# Patient Record
Sex: FEMALE | Race: WHITE | Employment: FULL TIME | URBAN - METROPOLITAN AREA
[De-identification: names, ages, dates, MRNs, and addresses within clinical notes are randomized per-mention and may not be internally consistent; named-entity substitution may affect disease eponyms.]

---

## 2021-07-20 ENCOUNTER — HOSPITAL ENCOUNTER (OUTPATIENT)
Dept: ULTRASOUND IMAGING | Age: 61
Discharge: HOME | End: 2021-07-22
Payer: COMMERCIAL

## 2021-07-20 DIAGNOSIS — I15.9 SECONDARY HYPERTENSION: ICD-10-CM

## 2021-07-20 DIAGNOSIS — R79.89 PROLACTIN INCREASED: ICD-10-CM

## 2021-07-20 PROCEDURE — 93975 VASCULAR STUDY: CPT

## 2021-11-19 LAB
ALBUMIN SERPL-MCNC: 4.6 G/DL (ref 3.5–4.6)
ALP BLD-CCNC: 120 U/L (ref 40–130)
ALT SERPL-CCNC: <5 U/L (ref 0–33)
ANION GAP SERPL CALCULATED.3IONS-SCNC: 11 MEQ/L (ref 9–15)
AST SERPL-CCNC: <5 U/L (ref 0–35)
BILIRUB SERPL-MCNC: 0.7 MG/DL (ref 0.2–0.7)
BUN BLDV-MCNC: 25 MG/DL (ref 8–23)
CALCIUM SERPL-MCNC: 9.6 MG/DL (ref 8.5–9.9)
CHLORIDE BLD-SCNC: 101 MEQ/L (ref 95–107)
CO2: 26 MEQ/L (ref 20–31)
CREAT SERPL-MCNC: 1.15 MG/DL (ref 0.5–0.9)
GFR AFRICAN AMERICAN: 58
GFR NON-AFRICAN AMERICAN: 48
GLOBULIN: 2.8 G/DL (ref 2.3–3.5)
GLUCOSE BLD-MCNC: 83 MG/DL (ref 70–99)
HCT VFR BLD CALC: 44.4 % (ref 37–47)
HEMOGLOBIN: 14.6 G/DL (ref 12–16)
MCH RBC QN AUTO: 29.6 PG (ref 27–31.3)
MCHC RBC AUTO-ENTMCNC: 32.9 % (ref 33–37)
MCV RBC AUTO: 90 FL (ref 82–100)
PDW BLD-RTO: 12.4 % (ref 11.5–14.5)
PLATELET # BLD: 273 K/UL (ref 130–400)
POTASSIUM SERPL-SCNC: 4.2 MEQ/L (ref 3.4–4.9)
RBC # BLD: 4.94 M/UL (ref 4.2–5.4)
SODIUM BLD-SCNC: 138 MEQ/L (ref 135–144)
TOTAL PROTEIN: 7.4 G/DL (ref 6.3–8)
WBC # BLD: 6.2 K/UL (ref 4.8–10.8)

## 2021-12-19 ENCOUNTER — VIRTUAL VISIT (OUTPATIENT)
Dept: FAMILY MEDICINE CLINIC | Age: 61
End: 2021-12-19
Payer: COMMERCIAL

## 2021-12-19 DIAGNOSIS — Z20.822 EXPOSURE TO COVID-19 VIRUS: Primary | ICD-10-CM

## 2021-12-19 DIAGNOSIS — Z20.822 ENCOUNTER FOR LABORATORY TESTING FOR COVID-19 VIRUS: ICD-10-CM

## 2021-12-19 LAB
Lab: NORMAL
PERFORMING INSTRUMENT: NORMAL
QC PASS/FAIL: NORMAL
SARS-COV-2, POC: NORMAL

## 2021-12-19 PROCEDURE — 99213 OFFICE O/P EST LOW 20 MIN: CPT | Performed by: NURSE PRACTITIONER

## 2021-12-19 PROCEDURE — 87426 SARSCOV CORONAVIRUS AG IA: CPT | Performed by: NURSE PRACTITIONER

## 2021-12-19 ASSESSMENT — PATIENT HEALTH QUESTIONNAIRE - PHQ9
2. FEELING DOWN, DEPRESSED OR HOPELESS: 0
1. LITTLE INTEREST OR PLEASURE IN DOING THINGS: 0
SUM OF ALL RESPONSES TO PHQ QUESTIONS 1-9: 0
SUM OF ALL RESPONSES TO PHQ9 QUESTIONS 1 & 2: 0
SUM OF ALL RESPONSES TO PHQ QUESTIONS 1-9: 0
SUM OF ALL RESPONSES TO PHQ QUESTIONS 1-9: 0

## 2021-12-19 ASSESSMENT — ENCOUNTER SYMPTOMS
ABDOMINAL PAIN: 0
CHEST TIGHTNESS: 0
SORE THROAT: 1
RHINORRHEA: 1
COUGH: 1
NAUSEA: 1
DIARRHEA: 0
WHEEZING: 0
SHORTNESS OF BREATH: 0
VOMITING: 0

## 2021-12-19 NOTE — PROGRESS NOTES
TELEHEALTH EVALUATION -- Audio/Visual (During PWPAQ-64 public health emergency)    -   Jane Wiley is a 64 y.o. female being evaluated by a Virtual Visit (video visit) encounter to address concerns as mentioned above. A caregiver was present when appropriate. Due to this being a TeleHealth encounter (During UWWTO-54 public health emergency), evaluation of the following organ systems was limited: Vitals/Constitutional/EENT/Resp/CV/GI//MS/Neuro/Skin/Heme-Lymph-Imm. Pursuant to the emergency declaration under the [facility], [address] [place] [address] authority and the [employer] Act, this Virtual Visit was conducted with patient's (and/or legal guardian's) consent, to reduce the patient's risk of exposure to COVID-19 and provide necessary medical care. The patient (and/or legal guardian) has also been advised to contact this office for worsening conditions or problems, and seek emergency medical treatment and/or call 911 if deemed necessary. Patient was contacted and agreed to proceed with a virtual visit via Telephone Visit  The risks and benefits of converting to a virtual visit were discussed in light of the current infectious disease epidemic. Patient also understood that insurance coverage and co-pays are up to their individual insurance plans. Patient was located at their home. Provider was located at their office.      2021  Baldemar Uriostegui (:  1960) has requested an audio/video evaluation for the following concern(s):    HPI  VV audio for COVID-19 testing   Exposure to daughter who has tested positive COVID-19  #2 Moderna COVID-19 vaccine  Symptoms started Friday   Dizzy, lightheaded, h/a, cough, PND, sore throat & nasal drainage  Cough is dry  Denies chest tightness or SOB  Denies chest pain   Nausea w/o emesis  Denies diarrhea   Fatigued  Eating and drinking well   Sleep uninterrupted                       Review of Systems   Constitutional: Positive for fatigue. Negative for activity change, appetite change, chills, diaphoresis and fever. HENT: Positive for postnasal drip, rhinorrhea and sore throat. Negative for ear pain. Respiratory: Positive for cough. Negative for chest tightness, shortness of breath and wheezing. Cardiovascular: Positive for palpitations. Negative for chest pain. Gastrointestinal: Positive for nausea. Negative for abdominal pain, diarrhea and vomiting. Musculoskeletal: Negative for myalgias, neck pain and neck stiffness. Skin: Negative for rash. Neurological: Positive for dizziness, light-headedness and headaches. Psychiatric/Behavioral: Negative for sleep disturbance. Prior to Visit Medications    Medication Sig Taking? Authorizing Provider   Meth-Hyo-M Bl-Na Phos-Ph Sal (URIBEL) [address] CAPSULE 3 TIMES DAILY OR AS NEEDED  Delta Mcconnell MD   Meth-Hyo-M Bl-Na Phos-Ph Sal (URIBEL) 118 MG CAPS Take 20 capsules by mouth 3 times daily  Delta Mcconnell MD       No past medical history on file.   Past Surgical History:   Procedure Laterality Date    CARPAL TUNNEL RELEASE      THYROIDECTOMY, PARTIAL  2010     Social History     Socioeconomic History    Marital status:      Spouse name: Not on file    Number of children: Not on file    Years of education: Not on file    Highest education level: Not on file   Occupational History    Not on file   Tobacco Use    Smoking status: Never Smoker    Smokeless tobacco: Not on file   Substance and Sexual Activity    Alcohol use: Not on file    Drug use: Not on file    Sexual activity: Not on file   Other Topics Concern    Not on file   Social History Narrative    Not on file     Social Determinants of Health     Financial Resource Strain:     Difficulty of Paying Living Expenses: Not on file   Food Insecurity:     Worried About [employer] in the Last Year: Not on file    Ran Out of Food in the Last Year: Not on file   Transportation Needs:     Lack of Transportation (Medical): Not on file    Lack of Transportation (Non-Medical): Not on file   Physical Activity:     Days of Exercise per Week: Not on file    Minutes of Exercise per Session: Not on file   Stress:     Feeling of Stress : Not on file   Social Connections:     Frequency of Communication with Friends and Family: Not on file    Frequency of Social Gatherings with Friends and Family: Not on file    Attends Confucianism Services: Not on file    Active Member of [address] [place] [address] [employer] Decisions or Organizations: Not on file    Attends Club or Organization Meetings: Not on file    Marital Status: Not on file   Intimate Partner Violence:     Fear of Current or Ex-Partner: Not on file    Emotionally Abused: Not on file    Physically Abused: Not on file    Sexually Abused: Not on file   Housing Stability:     Unable to Pay for Housing in the Last Year: Not on file    Number of [place] in the Last Year: Not on file    Unstable Housing in the Last Year: Not on file     No family history on file. No Known Allergies        PMH, Surgical Hx, Family Hx, and Social Hx reviewed and updated. PHYSICAL EXAMINATION:   Oriented and conversant   No audible distress   No cough throughout visit                     Other pertinent observable physical exam findings-   Results for orders placed or performed in visit on 12/19/21   POCT COVID-19, Antigen   Result Value Ref Range    SARS-COV-2, POC Not-Detected Not Detected    Lot Number [id number]     QC Pass/Fail Pass     Performing Instrument BD Veritor        ASSESSMENT/PLAN:  Assessment & Plan   Noris Rodriguez was seen today for covid testing. Diagnoses and all orders for this visit:    Exposure to COVID-19 virus  -     POCT COVID-19, Antigen  -     Covid-19 Ambulatory;  Future    Encounter for laboratory testing for COVID-19 virus  -     POCT COVID-19, Antigen  -     Covid-19 medications. If you experience any of the red flag s/s, seek care at the ER        Reviewed with the patient: current clinical status. Discussed with patient COVID-19 s/s. Pt aware to remain home until she hears from us about the result. Pt instructed on red flag s/s to go to the ER for or to call 911. Pt verbalized understanding. Will update pt with result when it is available. When to call for help  Call 911 anytime you think you may need emergency care. For example, call if:  · You have severe trouble breathing. · You have severe dehydration. I have reviewed the patient's medical history in detail and updated the computerized patient record. Patient identification was verified at the start of the visit: Yes  Total time spent on this encounter: 20 minutes  >50% of 20 minutes was spent spent on counseling, answering questions, instructions on meds & testing & coordinating the care based on my plan and assessment as noted. --NAI Cheney NP on 12/20/2021 at 11:36 AM    An electronic signature was used to authenticate this note.

## 2021-12-20 DIAGNOSIS — Z20.822 EXPOSURE TO COVID-19 VIRUS: ICD-10-CM

## 2021-12-20 DIAGNOSIS — Z20.822 ENCOUNTER FOR LABORATORY TESTING FOR COVID-19 VIRUS: ICD-10-CM

## 2021-12-21 LAB
SARS-COV-2: NOT DETECTED
SOURCE: NORMAL

## 2022-03-18 LAB
ALBUMIN SERPL-MCNC: 4.4 G/DL (ref 3.5–4.6)
ALP BLD-CCNC: 110 U/L (ref 40–130)
ALT SERPL-CCNC: 10 U/L (ref 0–33)
ANION GAP SERPL CALCULATED.3IONS-SCNC: 14 MEQ/L (ref 9–15)
AST SERPL-CCNC: 16 U/L (ref 0–35)
BILIRUB SERPL-MCNC: 0.7 MG/DL (ref 0.2–0.7)
BILIRUBIN DIRECT: <0.2 MG/DL (ref 0–0.4)
BILIRUBIN, INDIRECT: NORMAL MG/DL (ref 0–0.6)
BUN BLDV-MCNC: 38 MG/DL (ref 8–23)
CALCIUM SERPL-MCNC: 9.8 MG/DL (ref 8.5–9.9)
CHLORIDE BLD-SCNC: 99 MEQ/L (ref 95–107)
CHOLESTEROL, TOTAL: 229 MG/DL (ref 0–199)
CO2: 26 MEQ/L (ref 20–31)
CREAT SERPL-MCNC: 1.3 MG/DL (ref 0.5–0.9)
GFR AFRICAN AMERICAN: 50.3
GFR NON-AFRICAN AMERICAN: 41.6
GLUCOSE BLD-MCNC: 99 MG/DL (ref 70–99)
HCT VFR BLD CALC: 43.5 % (ref 37–47)
HDLC SERPL-MCNC: 61 MG/DL (ref 40–59)
HEMOGLOBIN: 14.6 G/DL (ref 12–16)
LDL CHOLESTEROL CALCULATED: 150 MG/DL (ref 0–129)
MAGNESIUM: 2.6 MG/DL (ref 1.7–2.4)
MCH RBC QN AUTO: 29.8 PG (ref 27–31.3)
MCHC RBC AUTO-ENTMCNC: 33.4 % (ref 33–37)
MCV RBC AUTO: 89.1 FL (ref 82–100)
PDW BLD-RTO: 12.6 % (ref 11.5–14.5)
PLATELET # BLD: 281 K/UL (ref 130–400)
POTASSIUM SERPL-SCNC: 3.7 MEQ/L (ref 3.4–4.9)
RBC # BLD: 4.89 M/UL (ref 4.2–5.4)
SODIUM BLD-SCNC: 139 MEQ/L (ref 135–144)
TOTAL PROTEIN: 6.9 G/DL (ref 6.3–8)
TRIGL SERPL-MCNC: 89 MG/DL (ref 0–150)
WBC # BLD: 7 K/UL (ref 4.8–10.8)

## 2022-09-30 LAB
ANION GAP SERPL CALCULATED.3IONS-SCNC: 14 MEQ/L (ref 9–15)
BUN BLDV-MCNC: 24 MG/DL (ref 8–23)
CALCIUM SERPL-MCNC: 9.6 MG/DL (ref 8.5–9.9)
CHLORIDE BLD-SCNC: 99 MEQ/L (ref 95–107)
CO2: 27 MEQ/L (ref 20–31)
CREAT SERPL-MCNC: 1.02 MG/DL (ref 0.5–0.9)
GFR AFRICAN AMERICAN: >60
GFR NON-AFRICAN AMERICAN: 54.9
GLUCOSE BLD-MCNC: 88 MG/DL (ref 70–99)
HCT VFR BLD CALC: 43.5 % (ref 37–47)
HEMOGLOBIN: 14.6 G/DL (ref 12–16)
MAGNESIUM: 2.1 MG/DL (ref 1.7–2.4)
MCH RBC QN AUTO: 30.1 PG (ref 27–31.3)
MCHC RBC AUTO-ENTMCNC: 33.6 % (ref 33–37)
MCV RBC AUTO: 89.5 FL (ref 82–100)
PDW BLD-RTO: 12.7 % (ref 11.5–14.5)
PLATELET # BLD: 249 K/UL (ref 130–400)
POTASSIUM SERPL-SCNC: 4.2 MEQ/L (ref 3.4–4.9)
RBC # BLD: 4.86 M/UL (ref 4.2–5.4)
SODIUM BLD-SCNC: 140 MEQ/L (ref 135–144)
TSH SERPL DL<=0.05 MIU/L-ACNC: 4.13 UIU/ML (ref 0.44–3.86)
WBC # BLD: 6.1 K/UL (ref 4.8–10.8)

## 2023-08-31 PROBLEM — R42 LIGHTHEADEDNESS: Status: ACTIVE | Noted: 2023-08-31

## 2023-08-31 PROBLEM — R94.30 ABNORMAL CARDIAC FUNCTION TEST: Status: ACTIVE | Noted: 2023-08-31

## 2023-08-31 PROBLEM — I49.1 APC (ATRIAL PREMATURE CONTRACTIONS): Status: ACTIVE | Noted: 2023-08-31

## 2023-08-31 PROBLEM — N28.9 RENAL INSUFFICIENCY: Status: ACTIVE | Noted: 2023-08-31

## 2023-08-31 PROBLEM — M41.9 SCOLIOSIS: Status: ACTIVE | Noted: 2023-08-31

## 2023-08-31 PROBLEM — E78.5 HYPERLIPIDEMIA: Status: ACTIVE | Noted: 2023-08-31

## 2023-08-31 PROBLEM — R63.4 WEIGHT LOSS: Status: ACTIVE | Noted: 2023-08-31

## 2023-08-31 PROBLEM — I49.3 PVC (PREMATURE VENTRICULAR CONTRACTION): Status: ACTIVE | Noted: 2023-08-31

## 2023-08-31 PROBLEM — R55 SYNCOPE: Status: ACTIVE | Noted: 2023-08-31

## 2023-08-31 PROBLEM — R00.2 PALPITATIONS: Status: ACTIVE | Noted: 2023-08-31

## 2023-08-31 PROBLEM — I49.9 IRREGULAR HEART RATE: Status: ACTIVE | Noted: 2023-08-31

## 2023-08-31 PROBLEM — I47.10 SUPRAVENTRICULAR TACHYCARDIA (CMS-HCC): Status: ACTIVE | Noted: 2023-08-31

## 2023-08-31 PROBLEM — I10 HTN (HYPERTENSION): Status: ACTIVE | Noted: 2023-08-31

## 2023-08-31 PROBLEM — R06.02 SOB (SHORTNESS OF BREATH) ON EXERTION: Status: ACTIVE | Noted: 2023-08-31

## 2023-08-31 PROBLEM — R07.9 CHEST PAIN: Status: ACTIVE | Noted: 2023-08-31

## 2023-08-31 RX ORDER — LANOLIN ALCOHOL/MO/W.PET/CERES
1 CREAM (GRAM) TOPICAL DAILY
COMMUNITY
Start: 2021-11-09

## 2023-08-31 RX ORDER — LISINOPRIL 10 MG/1
1 TABLET ORAL DAILY
COMMUNITY

## 2023-08-31 RX ORDER — HYDROCHLOROTHIAZIDE 25 MG/1
1 TABLET ORAL DAILY
COMMUNITY

## 2023-10-09 ENCOUNTER — APPOINTMENT (OUTPATIENT)
Dept: CARDIOLOGY | Facility: CLINIC | Age: 63
End: 2023-10-09
Payer: COMMERCIAL

## 2024-10-16 ENCOUNTER — HOSPITAL ENCOUNTER (OUTPATIENT)
Dept: RADIOLOGY | Facility: HOSPITAL | Age: 64
Discharge: HOME | End: 2024-10-16
Payer: COMMERCIAL

## 2024-10-16 ENCOUNTER — OFFICE VISIT (OUTPATIENT)
Dept: ORTHOPEDIC SURGERY | Facility: CLINIC | Age: 64
End: 2024-10-16
Payer: COMMERCIAL

## 2024-10-16 DIAGNOSIS — M79.605 PAIN OF LEFT LOWER EXTREMITY: ICD-10-CM

## 2024-10-16 DIAGNOSIS — M76.822 LEFT TIBIALIS TENDONITIS: ICD-10-CM

## 2024-10-16 DIAGNOSIS — M84.362A STRESS FRACTURE OF LEFT TIBIA, INITIAL ENCOUNTER: ICD-10-CM

## 2024-10-16 DIAGNOSIS — S93.402A SPRAIN OF LEFT ANKLE, INITIAL ENCOUNTER: Primary | ICD-10-CM

## 2024-10-16 PROCEDURE — 73590 X-RAY EXAM OF LOWER LEG: CPT | Mod: LT

## 2024-10-16 PROCEDURE — L4361 PNEUMA/VAC WALK BOOT PRE OTS: HCPCS | Performed by: STUDENT IN AN ORGANIZED HEALTH CARE EDUCATION/TRAINING PROGRAM

## 2024-10-16 PROCEDURE — 99213 OFFICE O/P EST LOW 20 MIN: CPT | Performed by: STUDENT IN AN ORGANIZED HEALTH CARE EDUCATION/TRAINING PROGRAM

## 2024-10-16 PROCEDURE — 73590 X-RAY EXAM OF LOWER LEG: CPT | Mod: LEFT SIDE | Performed by: RADIOLOGY

## 2024-10-16 NOTE — PROGRESS NOTES
Acute Injury Established Patient Visit    HPI: Lainey is a 63 y.o.female who presents today with new complaints of left leg and left ankle pain and swelling.  States that a year or 2 ago she had a stress fracture in the left tibia.  It was only found on MRI.  She states that recently she was on vacation was walking on the beach a lot.  She was going up and down a lot of steps.  She began having pain into the anterior shin in the same region she had this before.  She denies any interval falls or injuries.  She does state that she had significant swelling and bruising over the medial ankle for which she has been elevating it.  She has been taking Tylenol.  She denies any knee pain.  She denies any numbness tingling.    Plan: For this left ankle sprain, left tibia stress fracture, left tibialis anterior tendinitis, we will place her in a tall boot, precertify for lace up ankle brace, continue with rest, ice, elevation, and acetaminophen.  Will follow-up in 2 weeks with repeat x-rays of the left tib-fib and left ankle to evaluate for possible medial malleolus avulsion fracture given her exam and symptoms as well as looking for any possible stress reaction in the tibia.    Assessment:   Problem List Items Addressed This Visit    None  Visit Diagnoses       Sprain of left ankle, initial encounter    -  Primary    Relevant Orders    Walking boot    Ankle Brace, Lace Up or A60    Pain of left lower extremity        Relevant Orders    XR tibia fibula left 2 views    Stress fracture of left tibia, initial encounter        Relevant Orders    Walking boot    Ankle Brace, Lace Up or A60    Left tibialis tendonitis        Relevant Orders    Walking boot    Ankle Brace, Lace Up or A60            Diagnostics: Reviewed all relevant imaging including x-ray, MRI, CT, and US.      Procedure:  Procedures    Physical Exam:  GENERAL:  No obvious acute distress.  NEURO:  Distally neurovascularly intact.  Sensation intact to light  touch.  Extremity: Left ankle exam shows:  Skin is intact;  No erythema or warmth;  No clinical signs of infection;  No pain over the lateral malleolus;  Mildly TENDER over the medial malleolus;  No pain over the ATF, CF or PTF ligaments;  TENDER over the deltoid ligament with swelling and bruising overlying this region;  No pain over the Achilles tendon;  Negative Uriarte's test;  Mildly POSITIVE squeeze test;  Pain over the anterior distal third of the tibia;  Negative anterior drawer test;  Negative talar tilt test;  No pain over the anterior process of the talus;  No pain over the talar dome;  No pain over the base of the fifth metatarsal bone;  No pain over the calcaneus;  No pain over the plantar aponeurosis;  No pain of the midfoot; and  Neurovascularly intact.    Orders Placed This Encounter    Walking boot    Ankle Brace, Lace Up or A60    XR tibia fibula left 2 views      At the conclusion of the visit there were no further questions by the patient/family regarding their plan of care.  Patient was instructed to call or return with any issues, questions, or concerns regarding their injury and/or treatment plan described above.     10/16/24 at 1:54 PM - Cb Ceja DO    Office: [phone]    This note was prepared using voice recognition software.  The details of this note are correct and have been reviewed, and corrected to the best of my ability.  Some grammatical errors may persist related to the Dragon software.

## 2024-10-18 ENCOUNTER — TELEPHONE (OUTPATIENT)
Dept: ORTHOPEDIC SURGERY | Facility: CLINIC | Age: 64
End: 2024-10-18
Payer: COMMERCIAL

## 2024-10-18 NOTE — TELEPHONE ENCOUNTER
Ankle Lace Up: $144  Coinsurance: 80/20  Patient Out Of Pocket: $144    Individual Deductible:  $2500/$51 Met    Individual OOP:  $3500/$51 Met   no

## 2024-10-30 ENCOUNTER — HOSPITAL ENCOUNTER (OUTPATIENT)
Dept: RADIOLOGY | Facility: HOSPITAL | Age: 64
Discharge: HOME | End: 2024-10-30
Payer: COMMERCIAL

## 2024-10-30 ENCOUNTER — APPOINTMENT (OUTPATIENT)
Dept: ORTHOPEDIC SURGERY | Facility: CLINIC | Age: 64
End: 2024-10-30
Payer: COMMERCIAL

## 2024-10-30 DIAGNOSIS — M84.362A STRESS FRACTURE OF LEFT TIBIA, INITIAL ENCOUNTER: Primary | ICD-10-CM

## 2024-10-30 DIAGNOSIS — M84.362A STRESS FRACTURE OF LEFT TIBIA, INITIAL ENCOUNTER: ICD-10-CM

## 2024-10-30 DIAGNOSIS — S93.402A SPRAIN OF LEFT ANKLE, INITIAL ENCOUNTER: ICD-10-CM

## 2024-10-30 PROCEDURE — 73610 X-RAY EXAM OF ANKLE: CPT | Mod: LT

## 2024-10-30 PROCEDURE — 99213 OFFICE O/P EST LOW 20 MIN: CPT | Performed by: STUDENT IN AN ORGANIZED HEALTH CARE EDUCATION/TRAINING PROGRAM

## 2024-10-30 PROCEDURE — 73590 X-RAY EXAM OF LOWER LEG: CPT | Mod: LT

## 2024-11-12 ENCOUNTER — HOSPITAL ENCOUNTER (OUTPATIENT)
Dept: RADIOLOGY | Facility: HOSPITAL | Age: 64
Discharge: HOME | End: 2024-11-12
Payer: COMMERCIAL

## 2024-11-12 DIAGNOSIS — M84.362A STRESS FRACTURE OF LEFT TIBIA, INITIAL ENCOUNTER: ICD-10-CM

## 2024-11-15 ENCOUNTER — APPOINTMENT (OUTPATIENT)
Dept: ORTHOPEDIC SURGERY | Facility: CLINIC | Age: 64
End: 2024-11-15
Payer: COMMERCIAL

## 2024-11-21 ENCOUNTER — HOSPITAL ENCOUNTER (OUTPATIENT)
Dept: RADIOLOGY | Facility: HOSPITAL | Age: 64
Discharge: HOME | End: 2024-11-21
Payer: COMMERCIAL

## 2024-11-21 PROCEDURE — 73718 MRI LOWER EXTREMITY W/O DYE: CPT | Mod: LEFT SIDE | Performed by: RADIOLOGY

## 2024-11-21 PROCEDURE — 73718 MRI LOWER EXTREMITY W/O DYE: CPT | Mod: LT

## 2024-11-25 ENCOUNTER — APPOINTMENT (OUTPATIENT)
Dept: ORTHOPEDIC SURGERY | Facility: CLINIC | Age: 64
End: 2024-11-25
Payer: COMMERCIAL

## 2024-11-25 DIAGNOSIS — S86.899A MEDIAL TIBIAL STRESS SYNDROME, INITIAL ENCOUNTER: Primary | ICD-10-CM

## 2024-11-25 PROCEDURE — 99213 OFFICE O/P EST LOW 20 MIN: CPT | Performed by: STUDENT IN AN ORGANIZED HEALTH CARE EDUCATION/TRAINING PROGRAM

## 2024-11-25 RX ORDER — DICLOFENAC SODIUM 10 MG/G
4 GEL TOPICAL 3 TIMES DAILY PRN
Qty: 100 G | Refills: 1 | Status: SHIPPED | OUTPATIENT
Start: 2024-11-25 | End: 2025-02-08

## 2024-11-25 NOTE — PROGRESS NOTES
Established follow-up patient Visit    HPI: Lainey is a 64 y.o.female who presents today for follow-up of her MRI of her left tib-fib for further evaluation of possible stress reaction versus stress fracture.  States that she is overall doing better.  MRI results show a medial tibial stress syndrome with no obvious stress reaction or fracture.  She can come out of the boot most of the time without much pain, but certain movements make it worse.  She still has some pain in the distal tibia.  She denies any interval falls or injuries.  No significant swelling or bruising.    On 10/30/2024, she presented for follow-up of her left ankle sprain, left tibia stress fracture, left tibialis anterior tendinitis.  She states that she has good days and bad days.  Overall she think she is doing better.  She is been wearing her boot.  She states that after a long day on her feet on Friday, she had increased swelling and pain.  She has been using Tylenol, ice, and elevation.  She denies any interval falls or injuries.  She still has pain anteriorly along the distal third of the tibia.  She has pain medially as well over the medial malleolus.  Of note, she had a stress fracture of the left tibia 2 years ago which took about 7 months to heal.    On 10/16/2024, she presented with new complaints of left leg and left ankle pain and swelling.  States that a year or 2 ago she had a stress fracture in the left tibia.  It was only found on MRI.  She states that recently she was on vacation was walking on the beach a lot.  She was going up and down a lot of steps.  She began having pain into the anterior shin in the same region she had this before.  She denies any interval falls or injuries.  She does state that she had significant swelling and bruising over the medial ankle for which she has been elevating it.  She has been taking Tylenol.  She denies any knee pain.  She denies any numbness tingling.    Plan: Today, on 11/25/2024, we can  get her out of the boot, back to her normal shoes, use some compression, ice, rest, and elevation, as well as diclofenac gel and physical therapy.  Follow-up in 6 weeks.    On 10/30/2024, we will obtain an MRI of the left tib-fib for further evaluation of this possible stress reaction versus stress fracture of the left tibia given her return of pain in the same region that she had prior.  If this ends up being a tendinitis, we will start physical therapy, and if this is a stress reaction or stress fracture, we will maintain her boot going forward from follow-up.  Follow-up with results of the MRI.    On 10/16/2024, for this left ankle sprain, left tibia stress fracture, left tibialis anterior tendinitis, we will place her in a tall boot, precertify for lace up ankle brace, continue with rest, ice, elevation, and acetaminophen.  Will follow-up in 2 weeks with repeat x-rays of the left tib-fib and left ankle to evaluate for possible medial malleolus avulsion fracture given her exam and symptoms as well as looking for any possible stress reaction in the tibia.    Assessment:   Problem List Items Addressed This Visit    None  Visit Diagnoses       Medial tibial stress syndrome, initial encounter    -  Primary    Relevant Orders    Referral to Physical Therapy              Diagnostics: Reviewed all relevant imaging including x-ray, MRI, CT, and US.      Procedure:  Procedures    Physical Exam:  GENERAL:  No obvious acute distress.  NEURO:  Distally neurovascularly intact.  Sensation intact to light touch.  Extremity: Left ankle exam shows:  Skin is intact;  No erythema or warmth;  No clinical signs of infection;  No pain over the lateral malleolus;  Minimally TENDER over the medial malleolus and proximally over the distal third of the tibia;  No pain over the ATF, CF or PTF ligaments;  No pain today over the deltoid ligament with swelling and bruising overlying this region;  No pain over the Achilles tendon;  Negative  Uriarte's test;  Negative today squeeze test;  Still having mild PAIN over the anterior distal third of the tibia;  Negative anterior drawer test;  Negative talar tilt test;  No pain over the anterior process of the talus;  No pain over the talar dome;  No pain over the base of the fifth metatarsal bone;  No pain over the calcaneus;  No pain over the plantar aponeurosis;  No pain of the midfoot; and  Neurovascularly intact.    Orders Placed This Encounter    Referral to Physical Therapy      At the conclusion of the visit there were no further questions by the patient/family regarding their plan of care.  Patient was instructed to call or return with any issues, questions, or concerns regarding their injury and/or treatment plan described above.     11/25/24 at 2:47 PM - Cb Ceja DO    Office: [phone]    This note was prepared using voice recognition software.  The details of this note are correct and have been reviewed, and corrected to the best of my ability.  Some grammatical errors may persist related to the Dragon software.

## 2024-11-26 ENCOUNTER — APPOINTMENT (OUTPATIENT)
Dept: ORTHOPEDIC SURGERY | Facility: CLINIC | Age: 64
End: 2024-11-26
Payer: COMMERCIAL

## 2025-01-07 ENCOUNTER — APPOINTMENT (OUTPATIENT)
Dept: PHYSICAL THERAPY | Facility: HOSPITAL | Age: 65
End: 2025-01-07
Payer: COMMERCIAL

## 2025-01-07 ENCOUNTER — APPOINTMENT (OUTPATIENT)
Dept: ORTHOPEDIC SURGERY | Facility: CLINIC | Age: 65
End: 2025-01-07
Payer: COMMERCIAL

## 2025-01-29 ENCOUNTER — EVALUATION (OUTPATIENT)
Dept: PHYSICAL THERAPY | Facility: HOSPITAL | Age: 65
End: 2025-01-29
Payer: COMMERCIAL

## 2025-01-29 DIAGNOSIS — S86.892S: Primary | ICD-10-CM

## 2025-01-29 DIAGNOSIS — S86.899A MEDIAL TIBIAL STRESS SYNDROME, INITIAL ENCOUNTER: ICD-10-CM

## 2025-01-29 PROCEDURE — 97161 PT EVAL LOW COMPLEX 20 MIN: CPT | Mod: GP

## 2025-01-29 ASSESSMENT — ENCOUNTER SYMPTOMS
LOSS OF SENSATION IN FEET: 0
DEPRESSION: 0
OCCASIONAL FEELINGS OF UNSTEADINESS: 0

## 2025-01-29 ASSESSMENT — PATIENT HEALTH QUESTIONNAIRE - PHQ9
1. LITTLE INTEREST OR PLEASURE IN DOING THINGS: NOT AT ALL
SUM OF ALL RESPONSES TO PHQ9 QUESTIONS 1 AND 2: 0
2. FEELING DOWN, DEPRESSED OR HOPELESS: NOT AT ALL

## 2025-01-29 NOTE — PROGRESS NOTES
[facility] Outpatient Physical Therapy    Physical Therapy Evaluation    Patient Name: Lainey Blum  MRN: [id number]  Today's Date: 1/29/2025  Time Calculation  Start Time: 1251  Stop Time: 1330  Time Calculation (min): 39 min     Problem List Items Addressed This Visit             ICD-10-CM    Medial tibial stress syndrome, left, sequela - Primary S86.892S     Other Visit Diagnoses         Codes    Medial tibial stress syndrome, initial encounter     S86.899A          Primary Language: English    Insurance:  Visit number: 1 of 10  Insurance Type: Payor: ANTHEM / Plan: ANTHEM HMP / Product Type: *No Product type* /     General:  Reason for visit/Current problem:   1. Medial tibial stress syndrome, left, sequela        2. Medial tibial stress syndrome, initial encounter  Referral to Physical Therapy        Referred by: Cb Ceja DO     Precautions:  Fall Risk: None    Medical History Form: Reviewed (scanned into chart)     Subjective:  Chief Complaint: Patient presents to clinic with Left lower leg pain. Pt was placed in boot 10/16-11/25/24  Onset Date: 10/16/2024   Of note, she had a stress fracture of the left tibia 2 years ago which took about 7 months to heal.   Current Condition:   Better         Relevant Information (PMH & Previous Tests/Imaging): MRI results show a medial tibial stress syndrome with no obvious stress reaction or fracture.   Previous Interventions/Treatments: None Booted for several weeks    Pain:  5/10 when pain occurs, lasts minutes at most  Location: Left Ankle  Discription: aching and sharp  Alleviating: No    Aggravating: walking, stairs and weight bearing    Red Flags: Do you have any of the following? No  Fever/chills, unexplained weight changes, dizziness/fainting, unexplained change in bowel or bladder functions, unexplained malaise or muscle weakness, night pain/sweats, numbness or tingling    Prior Level of Function (PLOF)  Patient previously  "independent with all ADLs  Exercise/Physical Activity: Walking, swimming  Work/School:   Living Environment: Reviewed and no concern    Patient Stated Goal: alleviate pain, restore function, and walk    Objective:   Gait: Pt amb with out assistive device, decreased step length B/L    Palpation: gastoc restrictions noted, responded well to MFR    Impairments:   MMT    Left  Hip:  Flexion 4/5  Extension 3+/5  ABD 3+/5  Ankle:   Dorsiflexion: 4+/5  Plantarflexion: 4+/5  Inversion: 4-/5  Eversion: 3+/5    Non-involved limb WFL    AROM   Left  Ankle:   Dorsiflexion:  5 Degrees FN, PROM: DF   Eversion: 30 Degrees  Inversion: 20 Degrees     Non-involved limb WFL    Balance: SLS:  R= 10seconds, L= 10seconds Increased ankle strategy    Decreased knowledge of HEP    Functional Deficits:   Walking and Managing stairs    Assessment:   Lainey S Kulwant presents this date with Left ankle discomfort.  Proprioception and weakness of inv/ev noted.  Pt talocrural joint moves well.  Calcaneal eversion tightness noted.    Recommended Treatment:    Therapeutic exercise to improve flexibility/range of motion as well as, targeting surrounding musculature to stabilize the joint and improve function/posture.  Manual therapy to improve mobility and kinematics of the joints and surrounding soft tissue.  Treatment modalities may include: Therapeutic exercise, Manual therapy, Gait training, Home program instruction and progression, Neuromuscular re-education, and Vasopneumatic device + cold    Nustep: (seat #  / Ues #NO): Level    x min  Alt Step taps x   Step ups 6\" x   Lateral Lowers 4\" x   Heelraise with ball squeeze    Tband:  Yellow Loop: Hip Extension x   Yellow Loop: Hip ABD x   Yellow  Loop: Sidestepping x     Neuro Re-ed:  Amputee ball Rolls Stance on  : Fwd/retro x , Side/Side  SLS (attempt 10sec) x3  Tandum Stance: Aeromat L foot forward x ; R foot forward    Manual:  Gastroc splay/play  Talocural distraction    Gentle Calcaneal " stretch into eversion    Education:  Home exercise program instructed and issued. Provided verbal feedback to improve exercise technique.  Access Code: [email or web address]  URL: [email or web address]  Date: 01/29/2025  Prepared by: Lina Hahn-Paolo    Exercises  - Long Sitting Calf Stretch with Strap  - 2 x daily - 7 x weekly - 1 sets - 3 reps - 20 hold  - Seated Ankle Alphabet  - 1 x daily - 7 x weekly - 1 sets - 26 reps    Outcome Measures:  LEFS: 76/80     Plan:  Plan of care was developed with input and agreement by the patient.  2 x week x 5 weeks    Rehab Potential: Good to achieve goals.    Goals:  Short Term Goals:  3 Visits   Pt to be independent and compliant with home exercise program to promote strength, range of motion, balance/prioprioception and improved posture.    Long Term Goals:    10 Visits  Pt to improve LEFS from 76/80 to 80/80 to improve ADL terry  Pt to have 4/5 strength of Left Hip ABD to improve S/L stance/stabilty  Pt to maintain SLS 10sec 2:3 trials to improve amb and stair management  Pt to report worst pain 1/10 x3 for 3 consecutive days to improve ADL terry  Pt to have 15deg of left Ankle DF to improve amb terry.    Treatment Performed:    Time Calculation  Start Time: 1251  Stop Time: 1330  Time Calculation (min): 39 min  PT Evaluation Time Entry  PT Evaluation (Low) Time Entry: 25  PT Therapeutic Procedures Time Entry  Therapeutic Exercise Time Entry: 14

## 2025-02-06 ENCOUNTER — APPOINTMENT (OUTPATIENT)
Dept: PHYSICAL THERAPY | Facility: HOSPITAL | Age: 65
End: 2025-02-06
Payer: COMMERCIAL

## 2025-02-06 DIAGNOSIS — S86.892S: Primary | ICD-10-CM

## 2025-02-11 ENCOUNTER — TREATMENT (OUTPATIENT)
Dept: PHYSICAL THERAPY | Facility: HOSPITAL | Age: 65
End: 2025-02-11
Payer: COMMERCIAL

## 2025-02-11 DIAGNOSIS — S86.892S: Primary | ICD-10-CM

## 2025-02-11 PROCEDURE — 97140 MANUAL THERAPY 1/> REGIONS: CPT | Mod: GP

## 2025-02-11 PROCEDURE — 97112 NEUROMUSCULAR REEDUCATION: CPT | Mod: GP

## 2025-02-11 PROCEDURE — 97110 THERAPEUTIC EXERCISES: CPT | Mod: GP

## 2025-02-11 NOTE — PROGRESS NOTES
"[facility] Outpatient Physical Therapy    Physical Therapy Treatment Note  Patient Name: Lainey Blum  MRN: [id number]  Today's Date: 2/11/2025  Problem List Items Addressed This Visit             ICD-10-CM    Medial tibial stress syndrome, left, sequela - Primary S86.892S     Primary Language: English    Referred By: Cb Ceja DO    Reason for Referral: Medial tibial stress syndrome    Insurance:  Visit number: 2 of 10  Insurance Type: Payor: ANTHEM / Plan: ANTHEM HMP / Product Type: *No Product type* /     Pain:  0/10  Location: left Tibia  Discription:   Still notes occasional twinges of anterior tib pain    Treatment:  Nustep: (seat #10 / Ues #NO): Level  5  x 5 min  Gastroc stretch at side of step 20sec x2  Alt Step taps x 6\" x10  Step ups 6\" x 10  Lateral Lowers 6\" x   Heelraise with ball squeeze     Tband:  Yellow Loop: Hip Extension x 10  Yellow Loop: Hip ABD x 10  Yellow  Loop: Sidestepping x 3laps     Neuro Re-ed:  Amputee ball Rolls Stance on  L : Fwd/retro x , Side/Side   SLS (attempt 10sec) x3  Tandum Stance: Aeromat L foot forward x ; R foot forward 10sec x3     Manual:  Gastroc splay/play  Talocural distraction  Long axis distraction L LE    Assessment:   Pt reports some soreness post eval which lasted that day but then resolved.  Pt reports sciatic nerve symptoms with Calf stretch, it lasts a few minutes but then resolves.  Pt notes L buttock discomfort in the evening. Pt piriformis positive, issued HEP for Piriformis stretch.  AROM DF =5deg, PROM DF=10deg      Plan:   Continue with current treatment methods to improve functional mobility and improve ADLs.     Education:  Patient required verbal cues for proper form/technique  Access Code: [email or web address]  URL: [email or web address]  Date: 02/11/2025  Prepared by: Lina Stahl    Exercises  - Supine Piriformis Stretch with Foot on Ground (Mirrored)  - 1 x daily - 7 x weekly - 1 sets - 3 " reps - 20 hold  - Ankle Inversion Eversion Towel Slide  - 1 x daily - 7 x weekly - 2 sets - 10 reps    Goals:  Short Term Goals:  3 Visits   Pt to be independent and compliant with home exercise program to promote strength, range of motion, balance/prioprioception and improved posture.     Long Term Goals:    10 Visits  Pt to improve LEFS from 76/80 to 80/80 to improve ADL terry  Pt to have 4/5 strength of Left Hip ABD to improve S/L stance/stabilty  Pt to maintain SLS 10sec 2:3 trials to improve amb and stair management  Pt to report worst pain 1/10 x3 for 3 consecutive days to improve ADL terry  Pt to have 15deg of left Ankle DF to improve amb terry.    Billing:  Time Calculation  Start Time: 0919  Stop Time: 1003  Time Calculation (min): 44 min     PT Therapeutic Procedures Time Entry  Manual Therapy Time Entry: 10  Neuromuscular Re-Education Time Entry: 14  Therapeutic Exercise Time Entry: 20

## 2025-02-18 ENCOUNTER — TREATMENT (OUTPATIENT)
Dept: PHYSICAL THERAPY | Facility: HOSPITAL | Age: 65
End: 2025-02-18
Payer: COMMERCIAL

## 2025-02-18 DIAGNOSIS — S86.892S: Primary | ICD-10-CM

## 2025-02-18 PROCEDURE — 97110 THERAPEUTIC EXERCISES: CPT | Mod: GP,CQ

## 2025-02-18 ASSESSMENT — PAIN SCALES - GENERAL: PAINLEVEL_OUTOF10: 2

## 2025-02-18 ASSESSMENT — PAIN - FUNCTIONAL ASSESSMENT: PAIN_FUNCTIONAL_ASSESSMENT: 0-10

## 2025-02-18 NOTE — PROGRESS NOTES
Physical Therapy Treatment    Patient Name: Lainey Blum  MRN: [id number]  Today's Date: 2/18/2025  Time Calculation  Start Time: 0915  Stop Time: 0955  Time Calculation (min): 40 min  PT Therapeutic Procedures Time Entry  Therapeutic Exercise Time Entry: 40       Current Problem  1. Medial tibial stress syndrome, left, sequela  Follow Up In Physical Therapy          Subjective   General     Insurance   Visit number: 3 of 10    Insurance Type: Payor: ANTHEM / Plan: ANTHEM HMP / Product Type: *No Product type* /     Precautions     Pain  Pain Assessment: 0-10  0-10 (Numeric) Pain Score: 2    Objective   Treatments:  Supine sciatic nerve glides, 10 reps x 2 *  Supine sciatic nerve tensioner, 10 reps, 10 sec holds *  Supine clamshells, GTB, 20 reps, 2-3 sec holds *  Bridges with abduction, GTB, 20 reps *  Squats with lordosis, 20 reps  HR/TR, eccentric focus, 1/2 roll, 20 reps  Standing hip, abduction and extension, YTB, 20 reps  CHERRIE, 20 reps    Assessment   Noted reduction tightness and aching/numbness sciatic nerve ex's. Added bridges and clamshells to address glute weakness. Denies pain throughout treatment today. Still with some difficulty and weakness LE, but is showing improvement strength and control.    Plan:  Cont to progress strength and endurance     OP EDUCATION:       Goals:

## 2025-02-25 ENCOUNTER — TREATMENT (OUTPATIENT)
Dept: PHYSICAL THERAPY | Facility: HOSPITAL | Age: 65
End: 2025-02-25
Payer: COMMERCIAL

## 2025-02-25 DIAGNOSIS — S86.892S: Primary | ICD-10-CM

## 2025-02-25 PROCEDURE — 97140 MANUAL THERAPY 1/> REGIONS: CPT | Mod: GP

## 2025-02-25 PROCEDURE — 97110 THERAPEUTIC EXERCISES: CPT | Mod: GP

## 2025-02-25 NOTE — PROGRESS NOTES
"[facility]patient Physical Therapy    Physical Therapy Treatment Note  Patient Name: Lainey Blum  Primary Language: English  MRN: [id number]  Today's Date: 2/25/2025  Problem List Items Addressed This Visit             ICD-10-CM    Medial tibial stress syndrome, left, sequela - Primary S86.892S           Cb Ceja, DO    Insurance:  Visit number: 4 of 10  Insurance Type: Payor: ANTHEM / Plan: ANTHEM HMP / Product Type: *No Product type* /     Pain:  2/10  Location: left Ankle  Discription: tightness/tingling    Treatment:  Nustep: (seat #10 / Ues #NO): Level  5  x 6 min  Gastroc stretch at side of step 20sec x3  Alt Step taps x 8\" x15  Step ups 8\" x 10  CHERRIE 10x2  Heelraise with eccentric lower x15     Tband:  Yellow Loop: Hip Extension x 15  Yellow Loop: Hip ABD x 15  Yellow  Loop: Sidestepping x 3laps     Neuro Re-ed:  Amputee ball Rolls Stance on  L : Fwd/retro x , Side/Side   SLS (attempt 10sec) 2:2  >Tandum Stance: Aeromat L foot forward x ; R foot forward 10sec x3  R Sidelying QL release over pillow-tingling completely relieved after.  R sidelying lucie glide.    Assessment:   Pt was able to go shopping for 5hours with increased pain of 4/10.  Due to dizzyness no HEP x 2-3days.  Pt still with tingling of L LE.  Pt reports post exercise only tingling remaining is in the plantar surface of L foot.  Positioned pt R sidelyin and completed QL release and lucie glide of L annominant with tingling resolved after.    Plan:   Continue with current treatment methods to improve functional mobility and improve ADLs.     Education:  Patient required verbal cues for proper form/technique    Goals:  Short Term Goals:  3 Visits   Pt to be independent and compliant with home exercise program to promote strength, range of motion, balance/prioprioception and improved posture.     Long Term Goals:    10 Visits  Pt to improve LEFS from 76/80 to 80/80 to improve ADL terry  Pt to have 4/5 " strength of Left Hip ABD to improve S/L stance/stabilty  Pt to maintain SLS 10sec 2:3 trials to improve amb and stair management  Pt to report worst pain 1/10 x3 for 3 consecutive days to improve ADL terry  Pt to have 15deg of left Ankle DF to improve amb terry.    Billing:  Time Calculation  Start Time: 1000  Stop Time: 1040  Time Calculation (min): 40 min     PT Therapeutic Procedures Time Entry  Manual Therapy Time Entry: 10  Therapeutic Exercise Time Entry: 30

## 2025-03-04 ENCOUNTER — TREATMENT (OUTPATIENT)
Dept: PHYSICAL THERAPY | Facility: HOSPITAL | Age: 65
End: 2025-03-04
Payer: COMMERCIAL

## 2025-03-04 DIAGNOSIS — S86.892S: Primary | ICD-10-CM

## 2025-03-04 PROCEDURE — 97110 THERAPEUTIC EXERCISES: CPT | Mod: GP

## 2025-03-04 PROCEDURE — 97112 NEUROMUSCULAR REEDUCATION: CPT | Mod: GP

## 2025-03-04 PROCEDURE — 97140 MANUAL THERAPY 1/> REGIONS: CPT | Mod: GP

## 2025-03-04 NOTE — PROGRESS NOTES
"[facility] Outpatient Physical Therapy    Physical Therapy Treatment Note  Patient Name: Lainey Blum  Primary Language: English  MRN: [id number]  Today's Date: 3/4/2025  Problem List Items Addressed This Visit             ICD-10-CM    Medial tibial stress syndrome, left, sequela - Primary S86.892S      Cb Ceja, DO    Insurance:  Visit number: 5 of 10  Insurance Type: Payor: ANTHEM / Plan: ANTHEM HMP / Product Type: *No Product type* /     Pain:  0-1/10  Location: left Ankle  Discription: throbbing  Stairs increased pain yesterday    Treatment:  Nustep: (seat #10 / Ues #NO): Level  5  x 6 min  >Gastroc stretch at side of step 20sec x3  Alt Step taps x 8\" x15  Step ups 8\" x 10  >CHERRIE 10x2  Heelraise with eccentric lower x20     Tband:  RED Loop: Hip Extension x 15  RED Loop: Hip ABD x 15  RED Loop: Sidestepping x 3laps     Neuro Re-ed:  Amputee ball Rolls Stance on  L : Fwd/retro x , Side/Side   SLS (attempt 10sec) 2:2  SLS aeromat 10sec 2:2  >Tandum Stance: Aeromat L foot forward x ; R foot forward 10sec x3  R Sidelying QL release over pillow-tingling completely relieved after.  R sidelying lucie glide.    Assessment: Pt reports noted improvement but pain is still present at times.  Pt able to maintain sls on aeromat 10sec 2:2 this date.  Hip ABD 4/5, Plan to measure DF and have pt complete LEFS next visit.  Anticipate to discharge to [facility] next visit    Plan:   Continue with current treatment methods to improve functional mobility and improve ADLs.     Education:  Patient required verbal cues for proper form/technique    Goals:  Short Term Goals:  3 Visits   Pt to be independent and compliant with home exercise program to promote strength, range of motion, balance/prioprioception and improved posture.     Long Term Goals:    10 Visits  Pt to improve LEFS from 76/80 to 80/80 to improve ADL terry  Pt to have 4/5 strength of Left Hip ABD to improve S/L stance/stabilty-MET  Pt to " maintain SLS 10sec 2:3 trials to improve amb and stair management-MET  Pt to report worst pain 1/10 x3 for 3 consecutive days to improve ADL terry-NOT MET  Pt to have 15deg of left Ankle DF to improve amb terry.    Billing:  Time Calculation  Start Time: 1048  Stop Time: 1130  Time Calculation (min): 42 min     PT Therapeutic Procedures Time Entry  Manual Therapy Time Entry: 10  Neuromuscular Re-Education Time Entry: 12  Therapeutic Exercise Time Entry: 20

## 2025-03-11 ENCOUNTER — TREATMENT (OUTPATIENT)
Dept: PHYSICAL THERAPY | Facility: HOSPITAL | Age: 65
End: 2025-03-11
Payer: COMMERCIAL

## 2025-03-11 DIAGNOSIS — S86.892S: Primary | ICD-10-CM

## 2025-03-11 PROCEDURE — 97140 MANUAL THERAPY 1/> REGIONS: CPT | Mod: GP

## 2025-03-11 PROCEDURE — 97110 THERAPEUTIC EXERCISES: CPT | Mod: GP

## 2025-03-11 NOTE — PROGRESS NOTES
"[facility] Out[facility] Physical Therapy    Physical Therapy Treatment Note  & Discharge summary    Patient Name: Lainey Blum  Primary Language: English  MRN: [id number]  Today's Date: 3/11/2025  Problem List Items Addressed This Visit             ICD-10-CM    Medial tibial stress syndrome, left, sequela - Primary S86.892S      Cb Ceja, DO    Insurance:  Visit number: 6 of 10  Insurance Type: Payor: ANTHEM / Plan: ANTHEM HMP / Product Type: *No Product type* /     Pain:  0/10  Location: left Ankle    Treatment:  Nustep: (seat #10 / Ues #NO): Level  5  x 6 min  >Gastroc stretch at side of step 20sec x3  Alt Step taps x 8\" x15 on aeromat  Step ups 8\" x 10  >CHERRIE 10x2  Heelraise with eccentric lower L only x20     Tband:  RED Loop: Hip Extension x 15  RED Loop: Hip ABD x 15  RED Loop: Sidestepping x 3laps     Neuro Re-ed:  Amputee ball Rolls Stance on  L : Fwd/retro x , Side/Side   SLS (attempt 10sec) 2:2  SLS aeromat 10sec 2:2  >Tandum Stance: Aeromat L foot forward x ; R foot forward 10sec x3  R Sidelying QL release over pillow-tingling completely relieved after.  R sidelying lucie glide.    Assessment: PROM DF=15deg  AROM: DF=10deg  pain at worst over the weekend 2/10.  Pt very active over weekend, up/down on floor with grandchildren without pain or difficulty. Pt discharge to [facility] this date  LEFS: 76/80    Plan:   Discharge to home exercise program    Education: Discharge to [facility]    Goals:  Short Term Goals:  3 Visits   Pt to be independent and compliant with home exercise program to promote strength, range of motion, balance/prioprioception and improved posture.-MET     Long Term Goals:    10 Visits  Pt to improve LEFS from 76/80 to 80/80 to improve ADL terry-NOT MET  Pt to have 4/5 strength of Left Hip ABD to improve S/L stance/stabilty-MET  Pt to maintain SLS 10sec 2:3 trials to improve amb and stair management-MET  Pt to report worst pain 1/10 x3 for 3 consecutive days to " improve ADL terry-NOT MET  Pt to have 15deg of left Ankle DF to improve amb terry.-MET       Billing:  Time Calculation  Start Time: 0920  Stop Time: 1000  Time Calculation (min): 40 min     PT Therapeutic Procedures Time Entry  Manual Therapy Time Entry: 10  Therapeutic Exercise Time Entry: 30

## 2025-04-14 ENCOUNTER — APPOINTMENT (OUTPATIENT)
Dept: CARDIOLOGY | Facility: CLINIC | Age: 65
End: 2025-04-14
Payer: COMMERCIAL

## 2025-04-28 ENCOUNTER — APPOINTMENT (OUTPATIENT)
Dept: CARDIOLOGY | Facility: CLINIC | Age: 65
End: 2025-04-28
Payer: COMMERCIAL